# Patient Record
Sex: MALE | Race: WHITE | Employment: FULL TIME | URBAN - METROPOLITAN AREA
[De-identification: names, ages, dates, MRNs, and addresses within clinical notes are randomized per-mention and may not be internally consistent; named-entity substitution may affect disease eponyms.]

---

## 2021-09-06 ENCOUNTER — APPOINTMENT (OUTPATIENT)
Dept: GENERAL RADIOLOGY | Facility: CLINIC | Age: 31
DRG: 872 | End: 2021-09-06
Attending: EMERGENCY MEDICINE
Payer: COMMERCIAL

## 2021-09-06 ENCOUNTER — APPOINTMENT (OUTPATIENT)
Dept: ULTRASOUND IMAGING | Facility: CLINIC | Age: 31
DRG: 872 | End: 2021-09-06
Attending: PHYSICIAN ASSISTANT
Payer: COMMERCIAL

## 2021-09-06 ENCOUNTER — HOSPITAL ENCOUNTER (INPATIENT)
Facility: CLINIC | Age: 31
LOS: 6 days | Discharge: HOME | DRG: 872 | End: 2021-09-12
Attending: EMERGENCY MEDICINE | Admitting: INTERNAL MEDICINE
Payer: COMMERCIAL

## 2021-09-06 DIAGNOSIS — L03.116 CELLULITIS OF LEFT LOWER EXTREMITY: ICD-10-CM

## 2021-09-06 LAB
ANION GAP SERPL CALCULATED.3IONS-SCNC: 4 MMOL/L (ref 3–14)
BASOPHILS # BLD AUTO: 0 10E3/UL (ref 0–0.2)
BASOPHILS NFR BLD AUTO: 0 %
BUN SERPL-MCNC: 14 MG/DL (ref 7–30)
CALCIUM SERPL-MCNC: 8.6 MG/DL (ref 8.5–10.1)
CHLORIDE BLD-SCNC: 101 MMOL/L (ref 94–109)
CO2 SERPL-SCNC: 29 MMOL/L (ref 20–32)
CREAT SERPL-MCNC: 0.96 MG/DL (ref 0.66–1.25)
EOSINOPHIL # BLD AUTO: 0 10E3/UL (ref 0–0.7)
EOSINOPHIL NFR BLD AUTO: 0 %
ERYTHROCYTE [DISTWIDTH] IN BLOOD BY AUTOMATED COUNT: 13.2 % (ref 10–15)
GFR SERPL CREATININE-BSD FRML MDRD: >90 ML/MIN/1.73M2
GLUCOSE BLD-MCNC: 110 MG/DL (ref 70–99)
HCT VFR BLD AUTO: 40.2 % (ref 40–53)
HGB BLD-MCNC: 13.2 G/DL (ref 13.3–17.7)
HOLD SPECIMEN: NORMAL
IMM GRANULOCYTES # BLD: 0.1 10E3/UL
IMM GRANULOCYTES NFR BLD: 1 %
LYMPHOCYTES # BLD AUTO: 1.1 10E3/UL (ref 0.8–5.3)
LYMPHOCYTES NFR BLD AUTO: 10 %
MCH RBC QN AUTO: 28.5 PG (ref 26.5–33)
MCHC RBC AUTO-ENTMCNC: 32.8 G/DL (ref 31.5–36.5)
MCV RBC AUTO: 87 FL (ref 78–100)
MONOCYTES # BLD AUTO: 0.7 10E3/UL (ref 0–1.3)
MONOCYTES NFR BLD AUTO: 7 %
NEUTROPHILS # BLD AUTO: 8.6 10E3/UL (ref 1.6–8.3)
NEUTROPHILS NFR BLD AUTO: 82 %
NRBC # BLD AUTO: 0 10E3/UL
NRBC BLD AUTO-RTO: 0 /100
PLATELET # BLD AUTO: 129 10E3/UL (ref 150–450)
POTASSIUM BLD-SCNC: 3 MMOL/L (ref 3.4–5.3)
RBC # BLD AUTO: 4.63 10E6/UL (ref 4.4–5.9)
SODIUM SERPL-SCNC: 134 MMOL/L (ref 133–144)
WBC # BLD AUTO: 10.5 10E3/UL (ref 4–11)

## 2021-09-06 PROCEDURE — 99285 EMERGENCY DEPT VISIT HI MDM: CPT | Mod: 25

## 2021-09-06 PROCEDURE — 36415 COLL VENOUS BLD VENIPUNCTURE: CPT | Performed by: EMERGENCY MEDICINE

## 2021-09-06 PROCEDURE — 96365 THER/PROPH/DIAG IV INF INIT: CPT

## 2021-09-06 PROCEDURE — 93971 EXTREMITY STUDY: CPT | Mod: LT

## 2021-09-06 PROCEDURE — 73590 X-RAY EXAM OF LOWER LEG: CPT | Mod: LT

## 2021-09-06 PROCEDURE — 210N000002 HC R&B HEART CARE

## 2021-09-06 PROCEDURE — 82374 ASSAY BLOOD CARBON DIOXIDE: CPT | Performed by: EMERGENCY MEDICINE

## 2021-09-06 PROCEDURE — 250N000013 HC RX MED GY IP 250 OP 250 PS 637: Performed by: EMERGENCY MEDICINE

## 2021-09-06 PROCEDURE — 250N000011 HC RX IP 250 OP 636: Performed by: EMERGENCY MEDICINE

## 2021-09-06 PROCEDURE — 85025 COMPLETE CBC W/AUTO DIFF WBC: CPT | Performed by: PHYSICIAN ASSISTANT

## 2021-09-06 RX ORDER — POTASSIUM CHLORIDE 1500 MG/1
20 TABLET, EXTENDED RELEASE ORAL 2 TIMES DAILY
Qty: 10 TABLET | Refills: 1 | Status: SHIPPED | OUTPATIENT
Start: 2021-09-06 | End: 2021-09-12

## 2021-09-06 RX ORDER — CEPHALEXIN 500 MG/1
500 CAPSULE ORAL 4 TIMES DAILY
Qty: 40 CAPSULE | Refills: 0 | Status: SHIPPED | OUTPATIENT
Start: 2021-09-06 | End: 2021-09-12

## 2021-09-06 RX ORDER — CEFAZOLIN SODIUM 2 G/100ML
2 INJECTION, SOLUTION INTRAVENOUS ONCE
Status: COMPLETED | OUTPATIENT
Start: 2021-09-06 | End: 2021-09-06

## 2021-09-06 RX ORDER — POTASSIUM CHLORIDE 1500 MG/1
40 TABLET, EXTENDED RELEASE ORAL ONCE
Status: COMPLETED | OUTPATIENT
Start: 2021-09-06 | End: 2021-09-06

## 2021-09-06 RX ADMIN — CEFAZOLIN SODIUM 2 G: 2 INJECTION, SOLUTION INTRAVENOUS at 22:50

## 2021-09-06 RX ADMIN — POTASSIUM CHLORIDE 40 MEQ: 1500 TABLET, EXTENDED RELEASE ORAL at 23:39

## 2021-09-06 ASSESSMENT — MIFFLIN-ST. JEOR: SCORE: 2494.87

## 2021-09-06 ASSESSMENT — ENCOUNTER SYMPTOMS
FEVER: 1
VOMITING: 1
CHILLS: 1

## 2021-09-07 LAB
HOLD SPECIMEN: NORMAL
POTASSIUM BLD-SCNC: 3.2 MMOL/L (ref 3.4–5.3)
POTASSIUM BLD-SCNC: 3.4 MMOL/L (ref 3.4–5.3)
POTASSIUM BLD-SCNC: 3.4 MMOL/L (ref 3.4–5.3)
POTASSIUM BLD-SCNC: 3.7 MMOL/L (ref 3.4–5.3)
PROCALCITONIN SERPL-MCNC: 3.12 NG/ML
SARS-COV-2 RNA RESP QL NAA+PROBE: NEGATIVE

## 2021-09-07 PROCEDURE — 250N000013 HC RX MED GY IP 250 OP 250 PS 637: Performed by: INTERNAL MEDICINE

## 2021-09-07 PROCEDURE — 36415 COLL VENOUS BLD VENIPUNCTURE: CPT | Performed by: INTERNAL MEDICINE

## 2021-09-07 PROCEDURE — 210N000002 HC R&B HEART CARE

## 2021-09-07 PROCEDURE — 96372 THER/PROPH/DIAG INJ SC/IM: CPT | Performed by: INTERNAL MEDICINE

## 2021-09-07 PROCEDURE — 250N000011 HC RX IP 250 OP 636: Performed by: INTERNAL MEDICINE

## 2021-09-07 PROCEDURE — 250N000013 HC RX MED GY IP 250 OP 250 PS 637: Performed by: EMERGENCY MEDICINE

## 2021-09-07 PROCEDURE — 87635 SARS-COV-2 COVID-19 AMP PRB: CPT | Performed by: EMERGENCY MEDICINE

## 2021-09-07 PROCEDURE — 99222 1ST HOSP IP/OBS MODERATE 55: CPT | Mod: AI | Performed by: INTERNAL MEDICINE

## 2021-09-07 PROCEDURE — C9803 HOPD COVID-19 SPEC COLLECT: HCPCS

## 2021-09-07 PROCEDURE — 84132 ASSAY OF SERUM POTASSIUM: CPT | Performed by: INTERNAL MEDICINE

## 2021-09-07 PROCEDURE — 84145 PROCALCITONIN (PCT): CPT | Performed by: INTERNAL MEDICINE

## 2021-09-07 PROCEDURE — G0378 HOSPITAL OBSERVATION PER HR: HCPCS

## 2021-09-07 RX ORDER — POTASSIUM CHLORIDE 1500 MG/1
40 TABLET, EXTENDED RELEASE ORAL ONCE
Status: COMPLETED | OUTPATIENT
Start: 2021-09-07 | End: 2021-09-07

## 2021-09-07 RX ORDER — NALOXONE HYDROCHLORIDE 0.4 MG/ML
0.4 INJECTION, SOLUTION INTRAMUSCULAR; INTRAVENOUS; SUBCUTANEOUS
Status: DISCONTINUED | OUTPATIENT
Start: 2021-09-07 | End: 2021-09-12 | Stop reason: HOSPADM

## 2021-09-07 RX ORDER — OXYCODONE AND ACETAMINOPHEN 5; 325 MG/1; MG/1
1 TABLET ORAL EVERY 4 HOURS PRN
Status: DISCONTINUED | OUTPATIENT
Start: 2021-09-07 | End: 2021-09-12 | Stop reason: HOSPADM

## 2021-09-07 RX ORDER — FEXOFENADINE HCL 180 MG/1
180 TABLET ORAL DAILY PRN
COMMUNITY

## 2021-09-07 RX ORDER — ACETAMINOPHEN 650 MG/1
650 SUPPOSITORY RECTAL EVERY 6 HOURS PRN
Status: DISCONTINUED | OUTPATIENT
Start: 2021-09-07 | End: 2021-09-12 | Stop reason: HOSPADM

## 2021-09-07 RX ORDER — LIDOCAINE 40 MG/G
CREAM TOPICAL
Status: DISCONTINUED | OUTPATIENT
Start: 2021-09-07 | End: 2021-09-12 | Stop reason: HOSPADM

## 2021-09-07 RX ORDER — ACETAMINOPHEN 500 MG
1000 TABLET ORAL ONCE
Status: COMPLETED | OUTPATIENT
Start: 2021-09-07 | End: 2021-09-07

## 2021-09-07 RX ORDER — AMOXICILLIN 250 MG
2 CAPSULE ORAL 2 TIMES DAILY PRN
Status: DISCONTINUED | OUTPATIENT
Start: 2021-09-07 | End: 2021-09-12 | Stop reason: HOSPADM

## 2021-09-07 RX ORDER — ONDANSETRON 2 MG/ML
4 INJECTION INTRAMUSCULAR; INTRAVENOUS EVERY 6 HOURS PRN
Status: DISCONTINUED | OUTPATIENT
Start: 2021-09-07 | End: 2021-09-12 | Stop reason: HOSPADM

## 2021-09-07 RX ORDER — CEFAZOLIN SODIUM 2 G/100ML
2 INJECTION, SOLUTION INTRAVENOUS EVERY 8 HOURS
Status: DISCONTINUED | OUTPATIENT
Start: 2021-09-07 | End: 2021-09-09

## 2021-09-07 RX ORDER — NALOXONE HYDROCHLORIDE 0.4 MG/ML
0.2 INJECTION, SOLUTION INTRAMUSCULAR; INTRAVENOUS; SUBCUTANEOUS
Status: DISCONTINUED | OUTPATIENT
Start: 2021-09-07 | End: 2021-09-12 | Stop reason: HOSPADM

## 2021-09-07 RX ORDER — AMOXICILLIN 250 MG
1 CAPSULE ORAL 2 TIMES DAILY PRN
Status: DISCONTINUED | OUTPATIENT
Start: 2021-09-07 | End: 2021-09-12 | Stop reason: HOSPADM

## 2021-09-07 RX ORDER — FLUTICASONE PROPIONATE 50 MCG
1 SPRAY, SUSPENSION (ML) NASAL DAILY PRN
COMMUNITY

## 2021-09-07 RX ORDER — ONDANSETRON 4 MG/1
4 TABLET, ORALLY DISINTEGRATING ORAL EVERY 6 HOURS PRN
Status: DISCONTINUED | OUTPATIENT
Start: 2021-09-07 | End: 2021-09-12 | Stop reason: HOSPADM

## 2021-09-07 RX ORDER — ACETAMINOPHEN 325 MG/1
650 TABLET ORAL EVERY 6 HOURS PRN
Status: DISCONTINUED | OUTPATIENT
Start: 2021-09-07 | End: 2021-09-12 | Stop reason: HOSPADM

## 2021-09-07 RX ADMIN — POTASSIUM CHLORIDE 40 MEQ: 1500 TABLET, EXTENDED RELEASE ORAL at 08:15

## 2021-09-07 RX ADMIN — POTASSIUM CHLORIDE 40 MEQ: 1500 TABLET, EXTENDED RELEASE ORAL at 18:33

## 2021-09-07 RX ADMIN — ENOXAPARIN SODIUM 40 MG: 40 INJECTION SUBCUTANEOUS at 15:53

## 2021-09-07 RX ADMIN — ACETAMINOPHEN 650 MG: 325 TABLET, FILM COATED ORAL at 16:08

## 2021-09-07 RX ADMIN — ACETAMINOPHEN 1000 MG: 500 TABLET, FILM COATED ORAL at 00:15

## 2021-09-07 RX ADMIN — POTASSIUM CHLORIDE 40 MEQ: 1500 TABLET, EXTENDED RELEASE ORAL at 13:26

## 2021-09-07 RX ADMIN — CEFAZOLIN SODIUM 2 G: 2 INJECTION, SOLUTION INTRAVENOUS at 15:52

## 2021-09-07 RX ADMIN — CEFAZOLIN SODIUM 2 G: 2 INJECTION, SOLUTION INTRAVENOUS at 08:15

## 2021-09-07 ASSESSMENT — ACTIVITIES OF DAILY LIVING (ADL)
ADLS_ACUITY_SCORE: 17
TOILETING_ISSUES: NO
DOING_ERRANDS_INDEPENDENTLY_DIFFICULTY: NO
ADLS_ACUITY_SCORE: 15
WALKING_OR_CLIMBING_STAIRS_DIFFICULTY: NO
FALL_HISTORY_WITHIN_LAST_SIX_MONTHS: NO
WEAR_GLASSES_OR_BLIND: YES
DIFFICULTY_EATING/SWALLOWING: NO
DIFFICULTY_COMMUNICATING: NO
DRESSING/BATHING_DIFFICULTY: NO
CONCENTRATING,_REMEMBERING_OR_MAKING_DECISIONS_DIFFICULTY: NO

## 2021-09-07 ASSESSMENT — MIFFLIN-ST. JEOR: SCORE: 2680.84

## 2021-09-07 NOTE — ED PROVIDER NOTES
Rapid Assessment Note    History:   Emanuel Wilkinson is a 31 year old male with history of lymphedema who presents with left leg swelling. 4 days ago on Thursday the patient got what he believes to be food poisoning, and was vomiting for the following 2-3 days. Over the course of those days the patient felt feverish and had chills, although he never took his temperature. Yesterday the patient noticed increased swelling, soreness, and erythema of his left leg from baseline. These symptoms are exacerbated with ambulation and progressively worsened over the last 24 hours, prompting him to present today. Here in the ED his left lower leg is painful, and he denies any open wounds or known history of blood clots. Compared to his chronic history of leg edema, he states today's symptoms are more severe in nature and have not been alleviated with compression socks, ice, or elevation.     Exam:   Constitutional: alert, cooperative   CV: 2+ DP pulses, brisk distal cap refill  Pulm: No respiratory distress  MSK: left lower leg diffusely erythematous and swollen compared to right.   Neurological: Alert, attentive.  Strength and sensation intact to bilateral lower legs.  Skin: Skin is warm and dry.   Psych: Normal mood and affect     Plan of Care:   I evaluated the patient and developed an initial plan of care. I discussed this plan and explained that I, or one of my partners, would be returning to complete the evaluation.     I, Cristina Martinez, am serving as a scribe to document services personally performed by Tricia Rae PA-C, based on my observations and the provider's statements to me.    09/06/2021  [facility]    Portions of this medical record were completed by a scribe. UPON MY REVIEW AND AUTHENTICATION BY ELECTRONIC SIGNATURE, this confirms (a) I performed the applicable clinical services, and (b) the record is accurate.      Tricia Rae PA-C  09/06/21 [id number]

## 2021-09-07 NOTE — UTILIZATION REVIEW
"  Admission Status; Secondary Review Determination         Under the authority of the Utilization Management Committee, the utilization review process indicated a secondary review on the above patient.  The review outcome is based on review of the medical records, discussions with staff, and applying clinical experience noted on the date of the review.          (x) Observation Status Appropriate - This patient does not meet hospital inpatient criteria and is placed in observation status. If this patient's primary payer is Medicare and was admitted as an inpatient, Condition Code 44 should be used and patient status changed to \"observation\".     RATIONALE FOR DETERMINATION   31-year-old admitted with lymphedema and left lower extremity cellulitis, no leukocytosis or evidence of severe sepsis fever 100.4 in the emergency room he was hypokalemic receiving replacement.  No immunosuppression no infection of the prosthetic  The severity of illness, intensity of service provided, expected LOS and risk for adverse outcome make the care appropriate for further observation; however, doesn't meet criteria for hospital inpatient admission. Dr Kruger notified of this determination.    This document was produced using voice recognition software.      The information on this document is developed by the utilization review team in order for the business office to ensure compliance.  This only denotes the appropriateness of proper admission status and does not reflect the quality of care rendered.         The definitions of Inpatient Status and Observation Status used in making the determination above are those provided in the CMS Coverage Manual, Chapter 1 and Chapter 6, section 70.4.      Sincerely,     BARRETT RICKETTS MD    System Medical Director  Utilization Management  [facility].      "

## 2021-09-07 NOTE — ED TRIAGE NOTES
Pt presents with L leg swelling- pt states this has been a chronic problem and has been diagnosed with lymphedema. He had a US performed a while ago but nothing in recent years. Pt states over the weekend he was ill with vomiting (thinks it may be food poisoning) and then noticed today his leg was drastically worse. The leg is very swollen, has eccymosis, warm and redness. Pt reports pain with walking as well

## 2021-09-07 NOTE — ED PROVIDER NOTES
History   Chief Complaint:  Leg Swelling    The history is provided by the patient.      Emanuel Wilkinson is a 31 year old male with history of obesity and lymphedema who presents with pain, redness, and swelling to the distal aspect of his left lower extremity beginning yesterday and worsening since. The patient reports recent illness of emesis beginning 4 days ago and lasting for a period of 3 days, since resolving. At that time he experienced a subjective fever along with chills. He first noticed the swelling and tenderness to his leg last night, waking up this morning with significantly worsened symptoms, prompting his arrival to the ED today. Of note, the patient wears compression socks for his lymphedema, noting that the redness to his leg cuts off just where the JONNA stocking ends on his calf. He denies any recent surgeries or history of blood clots. The patient is fully COVID-19 vaccinated.     Review of Systems   Constitutional: Positive for chills (resolved) and fever (resolved).   Cardiovascular: Positive for leg swelling (left, lower).   Gastrointestinal: Positive for vomiting (resolved).   Skin: Positive for rash (with pain).   All other systems reviewed and are negative.        Allergies:  No known drug allergies    Medications:  Fexofenadine  Fluticasone  Norco    Past Medical History:    Lymphedema  Obesity    Social History:  The patient presented to the ED with his mother today.  The patient arrived in a private vehicle today.    Physical Exam     Patient Vitals for the past 24 hrs:   BP Temp Temp src Pulse Resp SpO2 Height Weight   09/06/21 2100 132/78 -- -- 92 -- 98 % -- --   09/06/21 1949 (!) 154/88 98.7  F (37.1  C) Oral 90 20 97 % 1.829 m (6') 149.7 kg (330 lb)     Physical Exam  Vitals reviewed.   Constitutional:       Appearance: He is obese.   HENT:      Head: Normocephalic.      Nose: Nose normal.   Eyes:      Conjunctiva/sclera: Conjunctivae normal.      Pupils: Pupils are equal, round,  and reactive to light.   Cardiovascular:      Rate and Rhythm: Normal rate.   Pulmonary:      Effort: Pulmonary effort is normal.   Abdominal:      Palpations: Abdomen is soft.   Skin:     Capillary Refill: Capillary refill takes less than 2 seconds.      Comments: There are purpura and erythema and warmth and tenderness and swelling of the left leg that extend from the dorsum of the foot up to the proximal left calf.  There is no clear open wound.  There is no fluctuance or crepitus.   Neurological:      General: No focal deficit present.      Mental Status: He is alert.   Psychiatric:         Mood and Affect: Mood normal.               Emergency Department Course     Imaging:    US Lower Extremity Venous Duplex Left  1.  No deep venous thrombosis in the left lower extremity although exam is compromised from patient's body habitus. Subcutaneous edema distally.  Results Per Radiology    XR Tibia & Fibula Left 2 Views  Marked soft tissue swelling. No evidence for soft tissue. Tibia and fibula appear normal.  Results Per Radiology    Laboratory:    CBC: WBC 10.5, HGB 13.2 (L),  (L)     BMP: Potassium 3.0 (L) Glucose 110 (H) o/w WNL (Creatinine 0.96)     Asymptomatic COVID-19 Virus (Coronavirus) by PCR Nasopharyngeal: Pending    Emergency Department Course:    Reviewed:  I reviewed nursing notes, vitals, past medical history and care everywhere.    Assessments:  2207 I obtained history and examined the patient as noted above.   2249 I rechecked the patient and explained findings. I took images of his leg at this time for documentation.  2340 I rechecked the patient. I explained plan for admission due to his febrile state.    Consults:  2351 I consulted with Dr. Arroyo, hospitalist, regarding the patient's history and presentation here in the emergency department who accepted the patient for admission.    Interventions:  2250 Ancef 2 g IV  2339 Potassium Chloride ER 40 mEq PO    Disposition:  The patient was  admitted to the hospital under the care of Dr. Arroyo.    Impression & Plan     Medical Decision Making:  Patient presents with left leg swelling and pain.  Examination is highly suspicious for cellulitis patient's ultrasound was performed and negative for DVT lab work shows normal white count x-ray shows no no subcutaneous air IV Ancef was initiated for cellulitis initially considered sending the patient home but while in the emergency room spiked a temperature that went from 98 4-100.4.  Based on low-grade fever in the setting of cellulitis recommend admission for IV antibiotics care was discussed with Dr. Arroyo admitted as an inpatient.    Covid-19  Emanuel Wilkinson was evaluated during a global COVID-19 pandemic, which necessitated consideration that the patient might be at risk for infection with the SARS-CoV-2 virus that causes COVID-19.   Applicable protocols for evaluation were followed during the patient's care.   COVID-19 was considered as part of the patient's evaluation. The plan for testing is:  a test was obtained during this visit.    Diagnosis:    ICD-10-CM    1. Cellulitis of left lower extremity  L03.116      Discharge Medications:  New Prescriptions    CEPHALEXIN (KEFLEX) 500 MG CAPSULE    Take 1 capsule (500 mg) by mouth 4 times daily for 10 days    POTASSIUM CHLORIDE ER (KLOR-CON M) 20 MEQ CR TABLET    Take 1 tablet (20 mEq) by mouth 2 times daily     Scribe Disclosure:  I, Tonie Maers, am serving as a scribe at 9:45 PM on 9/6/2021 to document services personally performed by Thad Cavanaugh MD based on my observations and the provider's statements to me.       Thad Cavanaugh MD  09/07/21 0058

## 2021-09-07 NOTE — DISCHARGE INSTRUCTIONS
We have performed lab work and x-rays and ultrasounds of your left leg that are all normal.  Clinical suspicions are for cellulitis.  Please continue antibiotic for 10-day course.  Return with rapid increase in redness up the leg or fever greater than 100.4.  Complete 10-day course of antibiotics.  Your potassium level was 3.0 please have this rechecked through your regular doctor consider potassium supplementation and diet or pills.

## 2021-09-07 NOTE — PHARMACY-ADMISSION MEDICATION HISTORY
Pharmacy Medication History  Admission medication history interview status for the 9/6/2021  admission is complete. See EPIC admission navigator for prior to admission medications     Location of Interview: Phone  Medication history sources: Patient, Surescripts and Care Everywhere    Significant changes made to the medication list:  Added both medications to list.     In the past week, patient estimated taking medication this percent of the time: 50-90% due to PRN meds    Additional medication history information:   None    Medication reconciliation completed by provider prior to medication history? No    Time spent in this activity: 5 minutes     Prior to Admission medications    Medication Sig Last Dose Taking? Auth Provider   fexofenadine (ALLEGRA) 180 MG tablet Take 180 mg by mouth daily as needed for allergies  at prn Yes Unknown, Entered By History   fluticasone (FLONASE) 50 MCG/ACT nasal spray Spray 1 spray into both nostrils daily as needed for rhinitis or allergies  at prn Yes Unknown, Entered By History       The information provided in this note is only as accurate as the sources available at the time of update(s)

## 2021-09-07 NOTE — PLAN OF CARE
Pt A&Ox4, VSS on RA ex fever- PRN tylenol and ice packs given. Indep, regular diet. PIV SL; intermit IV abx. LLE 4+ edema; swelling, warm to the touch. Denies pain at rest.  RLE 2+ edema- per pt this is baseline. K+ 3.4 replaced PO recheck @2300.

## 2021-09-07 NOTE — ED NOTES
Bed: ED20  Expected date: 9/6/21  Expected time:   Means of arrival:   Comments:  Triage, FT4, leg swelling , please get pt from FT

## 2021-09-07 NOTE — PROGRESS NOTES
RECEIVING UNIT ED HANDOFF REVIEW    ED Nurse Handoff Report was reviewed by: Ana Paula Bernardo RN on September 7, 2021 at 1:57 AM

## 2021-09-07 NOTE — PLAN OF CARE
VSS. Pt. Denies pain. Left lower extremity is red, shiny, warm and edematous. Continue IV antibiotics per order.

## 2021-09-07 NOTE — ED NOTES
[facility]  ED Nurse Handoff Report    ED Chief complaint: Leg Swelling      ED Diagnosis:   Final diagnoses:   Cellulitis of left lower extremity       Code Status: Full Code    Allergies: No Known Allergies    Patient Story: Presents with left lower leg swelling, redness and pain since last night.       Focused Assessment:  A&Ox4. Left lower extremity below knee erythema, edema, tenderness to touch. +bullae no drainage. Febrile at 2300 with tachycardia. Vital signs normalizing.     Treatments and/or interventions provided: LLE ultrasound, xray, labs, IV abx, tylenol, leg elevation  Patient's response to treatments and/or interventions: stable    To be done/followed up on inpatient unit:  stable    Does this patient have any cognitive concerns?: none    Activity level - Baseline/Home:  Independent  Activity Level - Current:   Independent    Patient's Preferred language: English   Needed?: No    Isolation: None  Infection: Not Applicable  Patient tested for COVID 19 prior to admission: YES  Bariatric?: No    Vital Signs:   Vitals:    09/06/21 1949 09/06/21 2100 09/06/21 2338   BP: (!) 154/88 132/78 137/84   Pulse: 90 92 97   Resp: 20     Temp: 98.7  F (37.1  C)  100.4  F (38  C)   TempSrc: Oral  Temporal   SpO2: 97% 98% 99%   Weight: 149.7 kg (330 lb)     Height: 1.829 m (6')         [facility]  ED Nurse Handoff Report    ED Chief complaint: Leg Swelling      ED Diagnosis:   Final diagnoses:   Cellulitis of left lower extremity       Code Status: Full Code    Allergies: No Known Allergies    Patient Story:   Focused Assessment:      Treatments and/or interventions provided:   Patient's response to treatments and/or interventions:     To be done/followed up on inpatient unit:      Does this patient have any cognitive concerns?: none    Activity level - Baseline/Home:  Independent  Activity Level - Current:   Independent    Patient's Preferred language: English    Needed?: No    Isolation: None  Infection: Not Applicable  Patient tested for COVID 19 prior to admission: YES  Bariatric?: No    Vital Signs:   Vitals:    09/06/21 1949 09/06/21 2100 09/06/21 2338   BP: (!) 154/88 132/78 137/84   Pulse: 90 92 97   Resp: 20     Temp: 98.7  F (37.1  C)  100.4  F (38  C)   TempSrc: Oral  Temporal   SpO2: 97% 98% 99%   Weight: 149.7 kg (330 lb)     Height: 1.829 m (6')         Cardiac Rhythm:     Was the PSS-3 completed:   Yes  What interventions are required if any?               Family Comments:   OBS brochure/video discussed/provided to patient/family: Yes              Name of person given brochure if not patient: n/a              Relationship to patient: n/a    For the majority of the shift this patient's behavior was Green.   Behavioral interventions performed were none.    ED NURSE PHONE NUMBER: *[phone]       Cardiac Rhythm:     Was the PSS-3 completed:   Yes  What interventions are required if any?               Family Comments: mother at bedside   OBS brochure/video discussed/provided to patient/family: Yes              Name of person given brochure if not patient: n/a              Relationship to patient: n/a    For the majority of the shift this patient's behavior was Green.   Behavioral interventions performed were none.    ED NURSE PHONE NUMBER: *[phone]

## 2021-09-07 NOTE — PROGRESS NOTES
[facility]    Medicine Progress Note - Hospitalist Service       Date of Admission:  9/6/2021    Assessment & Plan           Emanuel Wilkinson is a 31 year old male who presents with leg swelling     LLE cellulitis  Lymphedema  Typically wears compression socks. With LLE pain and erythema in last day, also has had subjective fevers and chills. Fever to 100.4 in ED. Tachycardic, BP stable. WBC normal. US without DVT. Subcutaneous edema noted. Procal 3.2  - nursing demarcate area of infection  - continue ancef 2g IV q8hrs  - elevate leg  - prn analgesics     Hypokalemia  K on presentation at 3.0  - replace per protocol      Morbid obesity  Encourage weight loss and healthy lifestyle.      Asymptomatic COVID-19 screening-negative       Diet: Combination Diet Regular Diet Adult    DVT Prophylaxis: Enoxaparin (Lovenox) SQ  Perdomo Catheter: Not present  Central Lines: None  Code Status: Full Code      Disposition Plan   Expected discharge: 09/09/2021   recommended to prior living arrangement once improving cellulitis and transition to PO antibiotics.     The patient's care was discussed with the Bedside Nurse, Patient and Patient's Family.    Lico Kruger MD  Hospitalist Service  [facility]  Securely message with the Vocera Web Console (learn more here)  Text page via C[employer] Paging/Directory      Clinically Significant Risk Factors Present on Admission              # Thrombocytopenia: Plts = 129 10e3/uL (Ref range: 150 - 450 10e3/uL) on admission, will monitor for bleeding      ______________________________________________________________________    Interval History   Afebrile since admission. Continued left leg swelling and erythema. More painful when he attempts to walk. Denies n/v or abdominal pain.     Data reviewed today: I reviewed all medications, new labs and imaging results over the last 24 hours. I personally reviewed no images or EKG's today.    Physical  Exam   Vital Signs: Temp: 98.6  F (37  C) Temp src: Oral BP: (!) 145/81 Pulse: 103   Resp: 18 SpO2: 99 % O2 Device: None (Room air)    Weight: 371 lbs 0 oz  General Appearance: Alert, awake and no apparent distress  Respiratory: clear to auscultation bilaterally, no wheezing  Cardiovascular: regular rate and rhythm  GI: soft and non-tender  Skin: LLE, + edema, warmth and erythema shin down to ankle, also dorsum of foot.      Data   Recent Labs   Lab 09/07/21  1222 09/07/21  0238 09/06/21  2220   WBC  --   --  10.5   HGB  --   --  13.2*   MCV  --   --  87   PLT  --   --  129*   NA  --   --  134   POTASSIUM 3.4 3.2* 3.0*   CHLORIDE  --   --  101   CO2  --   --  29   BUN  --   --  14   CR  --   --  0.96   ANIONGAP  --   --  4   JANINE  --   --  8.6   GLC  --   --  110*     Recent Results (from the past 24 hour(s))   US Lower Extremity Venous Duplex Left    Narrative    EXAM: US LOWER EXTREMITY VENOUS DUPLEX LEFT  LOCATION: [facility]  DATE/TIME: 9/6/2021 9:42 PM    INDICATION: Leg pain swelling and erythema  COMPARISON: None.  TECHNIQUE: Venous Duplex ultrasound of the left lower extremity with and without compression, augmentation and duplex. Color flow and spectral Doppler with waveform analysis performed.    FINDINGS: Exam includes the common femoral, femoral, popliteal, and contralateral common femoral veins as well as segmentally visualized deep calf veins and greater saphenous vein.     LEFT: No deep vein thrombosis. No superficial thrombophlebitis. No popliteal cyst. Nonspecific subcutaneous edema.      Impression    IMPRESSION:  1.  No deep venous thrombosis in the left lower extremity although exam is compromised from patient's body habitus. Subcutaneous edema distally.   XR Tibia & Fibula Left 2 Views    Narrative    EXAM: XR TIBIA and FIBULA LT 2 VW  LOCATION: [facility]  DATE/TIME: 9/6/2021 11:01 PM    INDICATION: eval for gas, swelling redness  COMPARISON:  None.      Impression    IMPRESSION: Marked soft tissue swelling. No evidence for soft tissue. Tibia and fibula appear normal.     Medications       ceFAZolin  2 g Intravenous Q8H     sodium chloride (PF)  3 mL Intracatheter Q8H

## 2021-09-07 NOTE — PROGRESS NOTES
Spoke with patient via phone regarding health insurance and patient states that he does have insurance and will have someone bring card in this afternoon.    9:57 AM  Per patient, he was seeing Dr. Emanuel Wong MD thru [facility] system for PCP but is thinking of changing his PCP.  He is undecided at this time.      2:21 PM  Rec'd insurance card from patient, copy made and sent to registration.    Pauline Pollard RN  Care Coordinator  [facility]  [phone] (text or call)

## 2021-09-07 NOTE — H&P
[facility]    History and Physical  Hospitalist       Date of Admission:  9/6/2021  Date of Service (when I saw the patient): 09/07/21    Assessment & Plan   Emanuel Wilkinson is a 31 year old male who presents with leg swelling    LLE cellulitis  Lymphedema  Typically wears compression socks. With LLE pain and erythema in last day, also has had subjective fevers and chills. Fever to 100.4 in ED. Tachycardic, BP stable. WBC normal. US without DVT. Subcutaneous edema noted.   - nursing demarcate area of infection  - continue ancef 2g IV   - check procal  - prn analgesics    Hypokalemia  K on presentation at 3.0  - replace per protocol     Morbid obesity  Encourage weight loss and healthy lifestyle.     COVID-19 pending    DVT Prophylaxis: Ambulate every shift  Code Status: Full Code    Disposition: Expected discharge in 2-3 days     Rj Arroyo MD  [phone] (P)  Text Page     Primary Care Physician   Physician No Ref-Primary    Chief Complaint   LLE pain, erythema and swelling    History is obtained from the patient and medical records    History of Present Illness   Emanuel Wilkinson is a 31 year old male who presents with left lower extremity swelling, redness and pain.  He recently had an upper GI illness with nausea and vomiting but this appeared to resolve.  Last day or day and a half he started to notice redness and pain in his left lower extremity.  On the day of presentation the symptoms worsened and become more painful.  Walking hurts.  He denied fevers or chills.  He denied any history of infection in his lower extremity.  Denies any chest pain or shortness of breath.  He typically does have some minimal redness on the anterior aspect of his shin on the left side.    Past Medical History    I have reviewed this patient's medical history and updated it with pertinent information if needed.   Past Medical History:   Diagnosis Date     Seasonal allergies        Past Surgical  History   I have reviewed this patient's surgical history and updated it with pertinent information if needed.  No past surgical history on file.    Prior to Admission Medications   Prior to Admission Medications   Prescriptions Last Dose Informant Patient Reported? Taking?   fexofenadine (ALLEGRA) 180 MG tablet  at prn  Yes Yes   Sig: Take 180 mg by mouth daily as needed for allergies   fluticasone (FLONASE) 50 MCG/ACT nasal spray  at prn  Yes Yes   Sig: Spray 1 spray into both nostrils daily as needed for rhinitis or allergies      Facility-Administered Medications: None     Allergies   No Known Allergies    Social History   I have reviewed this patient's social history and updated it with pertinent information if needed. Emanuel Wilkinson  reports that he has never smoked. He does not have any smokeless tobacco history on file. He reports current alcohol use of about 3.3 standard drinks of alcohol per week. He reports that he does not use drugs.    Family History   I have reviewed this patient's family history and updated it with pertinent information if needed.   Family history reviewed, pertinent family history as above    Review of Systems   The 10 point Review of Systems is negative other than noted in the HPI or here.     Physical Exam   Temp: 100.4  F (38  C) Temp src: Temporal BP: 137/84 Pulse: 97   Resp: 20 SpO2: 99 % O2 Device: None (Room air)    Vital Signs with Ranges  330 lbs 0 oz    Constitutional: alert, oriented and in no acute distress  Eyes: EOMI, PERRL  HEENT: OP clear  Respiratory: CTA B without w/c  Cardiovascular: RRR no murmur. LLE edema  GI: soft, obese, nontender  Lymph/Hematologic: no cervical LAD  Genitourinary: deferred  Skin: erythema of L leg approx 2/3 way up lower leg. Circumferential. ? Bullae on anterior shin. Erythema to level of ankle, some erythema on top of foot  Musculoskeletal: no deformities or arthritis  Neurologic: CN II-XII, MENON  Psychiatric: mood and affect wnl    Data    Data reviewed today:  I personally reviewed the US image(s) showing no DVT. XR without gas.  Recent Labs   Lab 09/06/21  2220   WBC 10.5   HGB 13.2*   MCV 87   *      POTASSIUM 3.0*   CHLORIDE 101   CO2 29   BUN 14   CR 0.96   ANIONGAP 4   JANINE 8.6   *       Recent Results (from the past 24 hour(s))   US Lower Extremity Venous Duplex Left    Narrative    EXAM: US LOWER EXTREMITY VENOUS DUPLEX LEFT  LOCATION: [facility]  DATE/TIME: 9/6/2021 9:42 PM    INDICATION: Leg pain swelling and erythema  COMPARISON: None.  TECHNIQUE: Venous Duplex ultrasound of the left lower extremity with and without compression, augmentation and duplex. Color flow and spectral Doppler with waveform analysis performed.    FINDINGS: Exam includes the common femoral, femoral, popliteal, and contralateral common femoral veins as well as segmentally visualized deep calf veins and greater saphenous vein.     LEFT: No deep vein thrombosis. No superficial thrombophlebitis. No popliteal cyst. Nonspecific subcutaneous edema.      Impression    IMPRESSION:  1.  No deep venous thrombosis in the left lower extremity although exam is compromised from patient's body habitus. Subcutaneous edema distally.   XR Tibia & Fibula Left 2 Views    Narrative    EXAM: XR TIBIA and FIBULA LT 2 VW  LOCATION: [facility]  DATE/TIME: 9/6/2021 11:01 PM    INDICATION: eval for gas, swelling redness  COMPARISON: None.      Impression    IMPRESSION: Marked soft tissue swelling. No evidence for soft tissue. Tibia and fibula appear normal.

## 2021-09-08 LAB
ANION GAP SERPL CALCULATED.3IONS-SCNC: 6 MMOL/L (ref 3–14)
BUN SERPL-MCNC: 13 MG/DL (ref 7–30)
CALCIUM SERPL-MCNC: 8.6 MG/DL (ref 8.5–10.1)
CHLORIDE BLD-SCNC: 103 MMOL/L (ref 94–109)
CO2 SERPL-SCNC: 26 MMOL/L (ref 20–32)
CREAT SERPL-MCNC: 0.84 MG/DL (ref 0.66–1.25)
CRP SERPL-MCNC: 306 MG/L (ref 0–8)
ERYTHROCYTE [DISTWIDTH] IN BLOOD BY AUTOMATED COUNT: 13.2 % (ref 10–15)
GFR SERPL CREATININE-BSD FRML MDRD: >90 ML/MIN/1.73M2
GLUCOSE BLD-MCNC: 100 MG/DL (ref 70–99)
HCT VFR BLD AUTO: 35.3 % (ref 40–53)
HGB BLD-MCNC: 11.8 G/DL (ref 13.3–17.7)
MCH RBC QN AUTO: 28.9 PG (ref 26.5–33)
MCHC RBC AUTO-ENTMCNC: 33.4 G/DL (ref 31.5–36.5)
MCV RBC AUTO: 86 FL (ref 78–100)
PLATELET # BLD AUTO: 126 10E3/UL (ref 150–450)
POTASSIUM BLD-SCNC: 3.7 MMOL/L (ref 3.4–5.3)
RBC # BLD AUTO: 4.09 10E6/UL (ref 4.4–5.9)
SODIUM SERPL-SCNC: 135 MMOL/L (ref 133–144)
WBC # BLD AUTO: 11.3 10E3/UL (ref 4–11)

## 2021-09-08 PROCEDURE — 36415 COLL VENOUS BLD VENIPUNCTURE: CPT | Performed by: INTERNAL MEDICINE

## 2021-09-08 PROCEDURE — 86140 C-REACTIVE PROTEIN: CPT | Performed by: HOSPITALIST

## 2021-09-08 PROCEDURE — 85027 COMPLETE CBC AUTOMATED: CPT | Performed by: INTERNAL MEDICINE

## 2021-09-08 PROCEDURE — 84295 ASSAY OF SERUM SODIUM: CPT | Performed by: INTERNAL MEDICINE

## 2021-09-08 PROCEDURE — 250N000011 HC RX IP 250 OP 636: Performed by: INTERNAL MEDICINE

## 2021-09-08 PROCEDURE — 250N000013 HC RX MED GY IP 250 OP 250 PS 637: Performed by: INTERNAL MEDICINE

## 2021-09-08 PROCEDURE — 99233 SBSQ HOSP IP/OBS HIGH 50: CPT | Performed by: HOSPITALIST

## 2021-09-08 PROCEDURE — 210N000002 HC R&B HEART CARE

## 2021-09-08 RX ADMIN — CEFAZOLIN SODIUM 2 G: 2 INJECTION, SOLUTION INTRAVENOUS at 13:33

## 2021-09-08 RX ADMIN — CEFAZOLIN SODIUM 2 G: 2 INJECTION, SOLUTION INTRAVENOUS at 06:02

## 2021-09-08 RX ADMIN — ACETAMINOPHEN 650 MG: 325 TABLET, FILM COATED ORAL at 00:09

## 2021-09-08 RX ADMIN — CEFAZOLIN SODIUM 2 G: 2 INJECTION, SOLUTION INTRAVENOUS at 21:21

## 2021-09-08 RX ADMIN — CEFAZOLIN SODIUM 2 G: 2 INJECTION, SOLUTION INTRAVENOUS at 00:02

## 2021-09-08 RX ADMIN — ACETAMINOPHEN 650 MG: 325 TABLET, FILM COATED ORAL at 11:39

## 2021-09-08 RX ADMIN — ACETAMINOPHEN 650 MG: 325 TABLET, FILM COATED ORAL at 20:17

## 2021-09-08 RX ADMIN — ENOXAPARIN SODIUM 40 MG: 40 INJECTION SUBCUTANEOUS at 16:15

## 2021-09-08 ASSESSMENT — ACTIVITIES OF DAILY LIVING (ADL)
ADLS_ACUITY_SCORE: 17
ADLS_ACUITY_SCORE: 17

## 2021-09-08 ASSESSMENT — MIFFLIN-ST. JEOR: SCORE: 2668.14

## 2021-09-08 NOTE — PROGRESS NOTES
[facility]    Medicine Progress Note - Hospitalist Service       Date of Admission:  9/6/2021    Assessment & Plan         Emanuel Wilkinson is a 31 year old male admitted with left lower extremity cellulitis.  He has significant leg edema- ultrasound negative for deep venous thrombosis.  He has been on intravenous cefazolin.      Left lower extremity cellulitis   Chronic left lower extremity edema   The the leg looks a little better.  Afebrile so far this morning.    Continue intravenous cefazolin    Keep left lower extremity elevated     Monitor fever curve    Check C-reactive protein     Repeat WBC and pro-calcitonin tomorrow     Hypokalemia   Potassium   Date Value Ref Range Status   09/08/2021 3.7 3.4 - 5.3 mmol/L Final   Corrected     Thrombocytopenia   ? Acute or chronic     Monitor while on enoxaparin and follow up with primary care provider     Elevated blood pressure     Needs primary care provider follow up and home monitoring    Morbid obesity    He needs lifestyle modification and follow up with a primary care provider      Diet: Combination Diet Regular Diet Adult    DVT Prophylaxis: Enoxaparin (Lovenox) SQ  Perdomo Catheter: Not present  Central Lines: None  Code Status: Full Code      Disposition Plan   Expected discharge: 09/09/2021   recommended to prior living arrangement once antibiotic plan established.     The patient's care was discussed with the Patient and Patient's Family.    Tawanda Wilson MD  Hospitalist Service  [facility]  Securely message with the Vocera Web Console (learn more here)  Text page via [email or web address] Paging/Directory    Clinically Significant Risk Factors Present on Admission              # Thrombocytopenia: Plts = 126 10e3/uL (Ref range: 150 - 450 10e3/uL) on admission, will monitor for bleeding      ______________________________________________________________________    Interval History   Stable overnight. Leg looks a  little better according to the patient and his father.      Data reviewed today: I reviewed all medications, new labs and imaging results over the last 24 hours. I personally reviewed no images or EKG's today.    Physical Exam   Vital Signs: Temp: 99.6  F (37.6  C) Temp src: Oral BP: (!) 151/73 Pulse: 100   Resp: 16 SpO2: 96 % O2 Device: None (Room air)    Weight: 368 lbs 3.2 oz   Body mass index is 49.94 kg/m .    Constitutional: awake, alert, cooperative, no apparent distress, and appears stated age  Musculoskeletal: There is no redness, warmth, or swelling of the joints.  Full range of motion noted.  Motor strength is 5 out of 5 all extremities bilaterally.  Tone is normal.  Neuropsychiatric: General: normal, calm and normal eye contact    Data   Recent Labs   Lab 09/08/21  0533 09/07/21  2307 09/07/21  1751 09/06/21  2220   WBC 11.3*  --   --  10.5   HGB 11.8*  --   --  13.2*   MCV 86  --   --  87   *  --   --  129*     --   --  134   POTASSIUM 3.7 3.7 3.4 3.0*   CHLORIDE 103  --   --  101   CO2 26  --   --  29   BUN 13  --   --  14   CR 0.84  --   --  0.96   ANIONGAP 6  --   --  4   JANINE 8.6  --   --  8.6   *  --   --  110*     No results found for this or any previous visit (from the past 24 hour(s)).  Medications       ceFAZolin  2 g Intravenous Q8H     enoxaparin ANTICOAGULANT  40 mg Subcutaneous Q24H     sodium chloride (PF)  3 mL Intracatheter Q8H

## 2021-09-08 NOTE — PLAN OF CARE
A&OX4, temp 99.1  tylenol given, tachycardic, HR in the 100s, RA, denies pain. Tele- none, no orders, up with SBA. LLE swelling, warm. Left leg elevated. On ancef IV. Regular diet.

## 2021-09-08 NOTE — UTILIZATION REVIEW
"  Admission Status; Secondary Review Determination         Under the authority of the Utilization Management Committee, the utilization review process indicated a secondary review on the above patient.  The review outcome is based on review of the medical records, discussions with staff, and applying clinical experience noted on the date of the review.        (xxx)      Inpatient Status Appropriate - This patient's medical care is consistent with medical management for inpatient care and reasonable inpatient medical practice.      () Observation Status Appropriate - This patient does not meet hospital inpatient criteria and is placed in observation status. If this patient's primary payer is Medicare and was admitted as an inpatient, Condition Code 44 should be used and patient status changed to \"observation\".   () Admission Status NOT Appropriate - This patient's medical care is not consistent with medical management for Inpatient or Observation Status.          RATIONALE FOR DETERMINATION     Emanuel Wilkinson is a 31 year old male admitted on 9/6/2021with lymphedema and left lower extremity cellulitis.  Doppler ultrasound was negative for deep venous thrombosis.  Initially he was afebrile and WBC normal.  However, yesterday he spiked a fever to 101.9 and WBC increased to 11.3 today.  I spoke with Dr. Wilson who states the patient will require a longer hospitalization for further IV antibiotics and monitoring.  At this time IP status is appropriate.    The severity of illness, intensity of service provided, expected LOS and risk for adverse outcome make the care complex, high risk and appropriate for hospital admission.        The information on this document is developed by the utilization review team in order for the business office to ensure compliance.  This only denotes the appropriateness of proper admission status and does not reflect the quality of care rendered.         The definitions of Inpatient Status " and Observation Status used in making the determination above are those provided in the CMS Coverage Manual, Chapter 1 and Chapter 6, section 70.4.      Sincerely,     Rebecca Dover MD  Physician Advisor   Utilization Review/ Case Management  [facility].

## 2021-09-08 NOTE — PLAN OF CARE
VSS. Tylenol given for low grade fever 99.8. Pt. Denies pain. Left leg elevated on pillows. Continue to monitor.

## 2021-09-09 ENCOUNTER — APPOINTMENT (OUTPATIENT)
Dept: ULTRASOUND IMAGING | Facility: CLINIC | Age: 31
DRG: 872 | End: 2021-09-09
Attending: HOSPITALIST
Payer: COMMERCIAL

## 2021-09-09 LAB
BASOPHILS # BLD AUTO: 0.1 10E3/UL (ref 0–0.2)
BASOPHILS NFR BLD AUTO: 0 %
EOSINOPHIL # BLD AUTO: 0 10E3/UL (ref 0–0.7)
EOSINOPHIL NFR BLD AUTO: 0 %
ERYTHROCYTE [DISTWIDTH] IN BLOOD BY AUTOMATED COUNT: 13.4 % (ref 10–15)
HCT VFR BLD AUTO: 33.9 % (ref 40–53)
HGB BLD-MCNC: 11.2 G/DL (ref 13.3–17.7)
IMM GRANULOCYTES # BLD: 0.1 10E3/UL
IMM GRANULOCYTES NFR BLD: 1 %
LACTATE SERPL-SCNC: 0.8 MMOL/L (ref 0.7–2)
LYMPHOCYTES # BLD AUTO: 1.4 10E3/UL (ref 0.8–5.3)
LYMPHOCYTES NFR BLD AUTO: 10 %
MCH RBC QN AUTO: 28.9 PG (ref 26.5–33)
MCHC RBC AUTO-ENTMCNC: 33 G/DL (ref 31.5–36.5)
MCV RBC AUTO: 87 FL (ref 78–100)
MONOCYTES # BLD AUTO: 0.8 10E3/UL (ref 0–1.3)
MONOCYTES NFR BLD AUTO: 6 %
MRSA DNA SPEC QL NAA+PROBE: NEGATIVE
NEUTROPHILS # BLD AUTO: 11.2 10E3/UL (ref 1.6–8.3)
NEUTROPHILS NFR BLD AUTO: 83 %
NRBC # BLD AUTO: 0 10E3/UL
NRBC BLD AUTO-RTO: 0 /100
PLATELET # BLD AUTO: 193 10E3/UL (ref 150–450)
POTASSIUM BLD-SCNC: 3.4 MMOL/L (ref 3.4–5.3)
PROCALCITONIN SERPL-MCNC: 1.58 NG/ML
RBC # BLD AUTO: 3.88 10E6/UL (ref 4.4–5.9)
SA TARGET DNA: NEGATIVE
WBC # BLD AUTO: 13.5 10E3/UL (ref 4–11)

## 2021-09-09 PROCEDURE — 250N000013 HC RX MED GY IP 250 OP 250 PS 637: Performed by: INTERNAL MEDICINE

## 2021-09-09 PROCEDURE — 120N000001 HC R&B MED SURG/OB

## 2021-09-09 PROCEDURE — 250N000011 HC RX IP 250 OP 636: Performed by: INTERNAL MEDICINE

## 2021-09-09 PROCEDURE — 36415 COLL VENOUS BLD VENIPUNCTURE: CPT | Performed by: HOSPITALIST

## 2021-09-09 PROCEDURE — 250N000011 HC RX IP 250 OP 636: Performed by: HOSPITALIST

## 2021-09-09 PROCEDURE — 87641 MR-STAPH DNA AMP PROBE: CPT | Performed by: HOSPITALIST

## 2021-09-09 PROCEDURE — 99233 SBSQ HOSP IP/OBS HIGH 50: CPT | Performed by: HOSPITALIST

## 2021-09-09 PROCEDURE — 83605 ASSAY OF LACTIC ACID: CPT | Performed by: HOSPITALIST

## 2021-09-09 PROCEDURE — 76882 US LMTD JT/FCL EVL NVASC XTR: CPT | Mod: LT

## 2021-09-09 PROCEDURE — 85025 COMPLETE CBC W/AUTO DIFF WBC: CPT | Performed by: HOSPITALIST

## 2021-09-09 PROCEDURE — 84145 PROCALCITONIN (PCT): CPT | Performed by: HOSPITALIST

## 2021-09-09 PROCEDURE — 84132 ASSAY OF SERUM POTASSIUM: CPT | Performed by: HOSPITALIST

## 2021-09-09 RX ORDER — PIPERACILLIN SODIUM, TAZOBACTAM SODIUM 4; .5 G/20ML; G/20ML
4.5 INJECTION, POWDER, LYOPHILIZED, FOR SOLUTION INTRAVENOUS EVERY 6 HOURS
Status: DISCONTINUED | OUTPATIENT
Start: 2021-09-09 | End: 2021-09-12 | Stop reason: HOSPADM

## 2021-09-09 RX ADMIN — PIPERACILLIN SODIUM AND TAZOBACTAM SODIUM 4.5 G: 4; .5 INJECTION, POWDER, LYOPHILIZED, FOR SOLUTION INTRAVENOUS at 12:13

## 2021-09-09 RX ADMIN — ACETAMINOPHEN 650 MG: 325 TABLET, FILM COATED ORAL at 20:41

## 2021-09-09 RX ADMIN — ACETAMINOPHEN 650 MG: 325 TABLET, FILM COATED ORAL at 15:41

## 2021-09-09 RX ADMIN — PIPERACILLIN SODIUM AND TAZOBACTAM SODIUM 4.5 G: 4; .5 INJECTION, POWDER, LYOPHILIZED, FOR SOLUTION INTRAVENOUS at 19:20

## 2021-09-09 RX ADMIN — CEFAZOLIN SODIUM 2 G: 2 INJECTION, SOLUTION INTRAVENOUS at 05:25

## 2021-09-09 RX ADMIN — ENOXAPARIN SODIUM 40 MG: 40 INJECTION SUBCUTANEOUS at 15:41

## 2021-09-09 ASSESSMENT — ACTIVITIES OF DAILY LIVING (ADL)
ADLS_ACUITY_SCORE: 17

## 2021-09-09 ASSESSMENT — MIFFLIN-ST. JEOR: SCORE: 2673.59

## 2021-09-09 NOTE — PROGRESS NOTES
[facility]    Medicine Progress Note - Hospitalist Service       Date of Admission:  9/6/2021    Assessment & Plan         Emanuel Wilkinson is a 31 year old male admitted with left lower extremity cellulitis.  He has significant leg edema- ultrasound negative for deep venous thrombosis.  He has been on intravenous cefazolin.      Sepsis secondary to left lower extremity cellulitis   Chronic left lower extremity edema   The the leg looks better but he had 103F temperature last night.  WBC 13.5K, C-reactive protein 300 yesterday- pro-calcitonin down from 3 to 1.5.     Lower extremity  ultrasound to rule out abscess     Change antibiotic to Zosyn     Keep left lower extremity elevated     Monitor fever curve    Repeat WBC, C-reactive protein  and pro-calcitonin tomorrow     Hypokalemia   Potassium   Date Value Ref Range Status   09/09/2021 3.4 3.4 - 5.3 mmol/L Final   Corrected     Acute hrombocytopenia - resolved  Platelet Count   Date Value Ref Range Status   09/09/2021 193 150 - 450 10e3/uL Final       Monitor while on enoxaparin    Elevated blood pressure     Needs primary care provider follow up and home monitoring    Morbid obesity    He needs lifestyle modification and follow up with a primary care provider      Diet: Combination Diet Regular Diet Adult    DVT Prophylaxis: Enoxaparin (Lovenox) SQ  Perdomo Catheter: Not present  Central Lines: None  Code Status: Full Code      Disposition Plan   Expected discharge: 09/09/2021   recommended to prior living arrangement once antibiotic plan established.     The patient's care was discussed with the Patient and Patient's Family.    Tawanda Wilson MD  Hospitalist Service  [facility]  Securely message with the Vocera Web Console (learn more here)  Text page via [employer] Paging/Directory    Clinically Significant Risk Factors Present on Admission                 ______________________________________________________________________    Interval History   103F temperature last evening.  No chills/rigors.  He feels that his leg looks and feels better.     Data reviewed today: I reviewed all medications, new labs and imaging results over the last 24 hours. I personally reviewed no images or EKG's today.    Physical Exam   Vital Signs: Temp: 98.7  F (37.1  C) Temp src: Oral BP: (!) 148/80 Pulse: 91   Resp: 18 SpO2: 95 % O2 Device: None (Room air)    Weight: 369 lbs 6.4 oz   Body mass index is 50.1 kg/m .    Constitutional: awake, alert, cooperative, no apparent distress, and appears stated age  Musculoskeletal: the left lower extremity is less erythematous than yesterday- no new areas of concern but there is considerable edema still   Neuropsychiatric: General: normal, calm and normal eye contact    Data   Recent Labs   Lab 09/09/21  0542 09/08/21  0533 09/07/21  2307 09/06/21  2220   WBC 13.5* 11.3*  --  10.5   HGB 11.2* 11.8*  --  13.2*   MCV 87 86  --  87    126*  --  129*   NA  --  135  --  134   POTASSIUM 3.4 3.7 3.7 3.0*   CHLORIDE  --  103  --  101   CO2  --  26  --  29   BUN  --  13  --  14   CR  --  0.84  --  0.96   ANIONGAP  --  6  --  4   JANINE  --  8.6  --  8.6   GLC  --  100*  --  110*     Recent Results (from the past 24 hour(s))   US Extremity Non Vascular Left    Narrative    LEFT LOWER EXTREMITY NONVASCULAR ULTRASOUND September 9, 2021 at 0929  hours    CLINICAL HISTORY: 31-year-old with soft tissue swelling, concern for  abscess.    TECHNIQUE: Grayscale and color Doppler sonographic imaging was  obtained of the left lower leg at the site of the patient's swelling.    COMPARISON: Radiographs 9/6/2021.    FINDINGS: Moderate ill-defined subcutaneous edema and fluid throughout  the superficial subcutaneous tissues of the anterior left lower leg.  No organized or drainable focal fluid collection.      Impression    IMPRESSION:  1.  Negative for focal or  drainable fluid collection.  2.  Moderate generalized subcutaneous edema in the anterior left shin  at the site of the patient's swelling.    MAGAN URENA MD         SYSTEM ID:  [id number]     Medications       enoxaparin ANTICOAGULANT  40 mg Subcutaneous Q24H     piperacillin-tazobactam  4.5 g Intravenous Q6H     sodium chloride (PF)  3 mL Intracatheter Q8H

## 2021-09-09 NOTE — PROGRESS NOTES
Pt VSS on RA. A&Ox4. Up ind, using bathroom. Left leg swelling improving, sarah new marking. Denies pain. Did have fever, gave tylenol with improvement. Plan to transfer to station 66, possible discharge tomorrow. IV abxs switched to zosyn. Continue to monitor.

## 2021-09-09 NOTE — PLAN OF CARE
Pt is A&Ox4, denies pain at rest, VSS except spike of temp 103.1 F- IS given, LS clear, on RA, L leg elevated on pillows, edges marked, independent in room, Ancef 2g/100 ml given, continue to monitor.

## 2021-09-10 LAB
CRP SERPL-MCNC: 275 MG/L (ref 0–8)
LACTATE SERPL-SCNC: 0.6 MMOL/L (ref 0.7–2)
WBC # BLD AUTO: 14.7 10E3/UL (ref 4–11)

## 2021-09-10 PROCEDURE — 250N000011 HC RX IP 250 OP 636: Performed by: INTERNAL MEDICINE

## 2021-09-10 PROCEDURE — 120N000001 HC R&B MED SURG/OB

## 2021-09-10 PROCEDURE — 250N000011 HC RX IP 250 OP 636: Performed by: HOSPITALIST

## 2021-09-10 PROCEDURE — 83605 ASSAY OF LACTIC ACID: CPT | Performed by: HOSPITALIST

## 2021-09-10 PROCEDURE — 85048 AUTOMATED LEUKOCYTE COUNT: CPT | Performed by: HOSPITALIST

## 2021-09-10 PROCEDURE — 250N000013 HC RX MED GY IP 250 OP 250 PS 637: Performed by: INTERNAL MEDICINE

## 2021-09-10 PROCEDURE — 36415 COLL VENOUS BLD VENIPUNCTURE: CPT | Performed by: HOSPITALIST

## 2021-09-10 PROCEDURE — 86140 C-REACTIVE PROTEIN: CPT | Performed by: HOSPITALIST

## 2021-09-10 PROCEDURE — 99232 SBSQ HOSP IP/OBS MODERATE 35: CPT | Performed by: HOSPITALIST

## 2021-09-10 RX ADMIN — PIPERACILLIN SODIUM AND TAZOBACTAM SODIUM 4.5 G: 4; .5 INJECTION, POWDER, LYOPHILIZED, FOR SOLUTION INTRAVENOUS at 23:45

## 2021-09-10 RX ADMIN — ACETAMINOPHEN 650 MG: 325 TABLET, FILM COATED ORAL at 06:13

## 2021-09-10 RX ADMIN — PIPERACILLIN SODIUM AND TAZOBACTAM SODIUM 4.5 G: 4; .5 INJECTION, POWDER, LYOPHILIZED, FOR SOLUTION INTRAVENOUS at 18:07

## 2021-09-10 RX ADMIN — PIPERACILLIN SODIUM AND TAZOBACTAM SODIUM 4.5 G: 4; .5 INJECTION, POWDER, LYOPHILIZED, FOR SOLUTION INTRAVENOUS at 00:37

## 2021-09-10 RX ADMIN — PIPERACILLIN SODIUM AND TAZOBACTAM SODIUM 4.5 G: 4; .5 INJECTION, POWDER, LYOPHILIZED, FOR SOLUTION INTRAVENOUS at 11:47

## 2021-09-10 RX ADMIN — PIPERACILLIN SODIUM AND TAZOBACTAM SODIUM 4.5 G: 4; .5 INJECTION, POWDER, LYOPHILIZED, FOR SOLUTION INTRAVENOUS at 06:00

## 2021-09-10 RX ADMIN — ENOXAPARIN SODIUM 40 MG: 40 INJECTION SUBCUTANEOUS at 15:32

## 2021-09-10 ASSESSMENT — ACTIVITIES OF DAILY LIVING (ADL)
ADLS_ACUITY_SCORE: 17

## 2021-09-10 NOTE — PLAN OF CARE
DATE & TIME: 9/9/21 2015 - 0730    Cognitive Concerns/ Orientation : A/O x4   BEHAVIOR & AGGRESSION TOOL COLOR: Green  CIWA SCORE: n/a   ABNL VS/O2: Febrile - temps 100.9 & 100.4 (PRN APAP given twice), BP slightly elevated, tachy at times low-100s, RA  MOBILITY: Ind  PAIN MANAGMENT: Denies  DIET: Regular  BOWEL/BLADDER: Continent, up to bathroom  ABNL LAB/BG: WBC 13.5  DRAIN/DEVICES: PIV SL, IV Zosyn  TELEMETRY RHYTHM: n/a  SKIN: LLE red, warm, +4 edema  TESTS/PROCEDURES: US neg for DVT  D/C DATE: Pending abx plan; possibly home today  Discharge Barriers: IV abx

## 2021-09-10 NOTE — PROGRESS NOTES
[facility]    Medicine Progress Note - Hospitalist Service       Date of Admission:  9/6/2021    Assessment & Plan       Emanuel Wilkinson is a 31 year old male admitted with left lower extremity cellulitis.  He has significant leg edema- ultrasound negative for deep venous thrombosis.     Sepsis secondary to left lower extremity cellulitis   Chronic left lower extremity edema   Ultrasound negative for abscess.  Antibiotic switched to Zosyn 9/9.  Tmax down from 103F to 101F yesterday.  C-reactive protein is down a little.      Continue Zosyn     Keep left lower extremity elevated     Monitor fever curve    Repeat WBC, C-reactive protein  and pro-calcitonin tomorrow     Hypokalemia   Potassium   Date Value Ref Range Status   09/09/2021 3.4 3.4 - 5.3 mmol/L Final   Corrected     Acute hrombocytopenia - resolved  Platelet Count   Date Value Ref Range Status   09/09/2021 193 150 - 450 10e3/uL Final       Monitor while on enoxaparin    Elevated blood pressure     Needs primary care provider follow up and home monitoring    Morbid obesity    He needs lifestyle modification and follow up with a primary care provider      Diet: Combination Diet Regular Diet Adult    DVT Prophylaxis: Enoxaparin (Lovenox) SQ  Perdomo Catheter: Not present  Central Lines: None  Code Status: Full Code      Disposition Plan   Expected discharge: 09/11/2021   recommended to prior living arrangement once antibiotic plan established.     The patient's care was discussed with the Patient and Patient's Family.    Tawanda Wilson MD  Hospitalist Service  [facility]  Securely message with the Vocera Web Console (learn more here)  Text page via [employer] Paging/Directory    Clinically Significant Risk Factors Present on Admission                ______________________________________________________________________    Interval History   No new complaints.  Leg is still swollen.  Tmax was 101F last night.       Data reviewed today: I reviewed all medications, new labs and imaging results over the last 24 hours. I personally reviewed no images or EKG's today.    Physical Exam   Vital Signs: Temp: 99.2  F (37.3  C) Temp src: Oral BP: (!) 145/88 Pulse: 101   Resp: 18 SpO2: 96 % O2 Device: None (Room air)    Weight: 369 lbs 6.4 oz   Body mass index is 50.1 kg/m .    Constitutional: awake, alert, cooperative, no apparent distress, and appears stated age  Musculoskeletal: the left lower extremity looks about the same although the blistering is improved.  Neuropsychiatric: General: normal, calm and normal eye contact    Data   Recent Labs   Lab 09/10/21  0749 09/09/21  0542 09/08/21  0533 09/07/21  2307 09/06/21  2220   WBC 14.7* 13.5* 11.3*  --  10.5   HGB  --  11.2* 11.8*  --  13.2*   MCV  --  87 86  --  87   PLT  --  193 126*  --  129*   NA  --   --  135  --  134   POTASSIUM  --  3.4 3.7 3.7 3.0*   CHLORIDE  --   --  103  --  101   CO2  --   --  26  --  29   BUN  --   --  13  --  14   CR  --   --  0.84  --  0.96   ANIONGAP  --   --  6  --  4   JANINE  --   --  8.6  --  8.6   GLC  --   --  100*  --  110*     No results found for this or any previous visit (from the past 24 hour(s)).  Medications       enoxaparin ANTICOAGULANT  40 mg Subcutaneous Q24H     piperacillin-tazobactam  4.5 g Intravenous Q6H     sodium chloride (PF)  3 mL Intracatheter Q8H

## 2021-09-10 NOTE — PROGRESS NOTES
Pt reported having had 3 loose med stool today (12 hour period). Denied nausea, abd cramp. Will monitor.

## 2021-09-11 LAB
CRP SERPL-MCNC: 190 MG/L (ref 0–8)
POTASSIUM BLD-SCNC: 3.6 MMOL/L (ref 3.4–5.3)
WBC # BLD AUTO: 12.1 10E3/UL (ref 4–11)

## 2021-09-11 PROCEDURE — 84132 ASSAY OF SERUM POTASSIUM: CPT | Performed by: HOSPITALIST

## 2021-09-11 PROCEDURE — 85048 AUTOMATED LEUKOCYTE COUNT: CPT | Performed by: HOSPITALIST

## 2021-09-11 PROCEDURE — 99232 SBSQ HOSP IP/OBS MODERATE 35: CPT | Performed by: HOSPITALIST

## 2021-09-11 PROCEDURE — 250N000013 HC RX MED GY IP 250 OP 250 PS 637: Performed by: INTERNAL MEDICINE

## 2021-09-11 PROCEDURE — 36415 COLL VENOUS BLD VENIPUNCTURE: CPT | Performed by: HOSPITALIST

## 2021-09-11 PROCEDURE — 250N000011 HC RX IP 250 OP 636: Performed by: HOSPITALIST

## 2021-09-11 PROCEDURE — 86140 C-REACTIVE PROTEIN: CPT | Performed by: HOSPITALIST

## 2021-09-11 PROCEDURE — 120N000001 HC R&B MED SURG/OB

## 2021-09-11 PROCEDURE — 250N000011 HC RX IP 250 OP 636: Performed by: INTERNAL MEDICINE

## 2021-09-11 RX ADMIN — ACETAMINOPHEN 650 MG: 325 TABLET, FILM COATED ORAL at 16:45

## 2021-09-11 RX ADMIN — ACETAMINOPHEN 650 MG: 325 TABLET, FILM COATED ORAL at 22:44

## 2021-09-11 RX ADMIN — ACETAMINOPHEN 650 MG: 325 TABLET, FILM COATED ORAL at 00:17

## 2021-09-11 RX ADMIN — PIPERACILLIN SODIUM AND TAZOBACTAM SODIUM 4.5 G: 4; .5 INJECTION, POWDER, LYOPHILIZED, FOR SOLUTION INTRAVENOUS at 22:44

## 2021-09-11 RX ADMIN — PIPERACILLIN SODIUM AND TAZOBACTAM SODIUM 4.5 G: 4; .5 INJECTION, POWDER, LYOPHILIZED, FOR SOLUTION INTRAVENOUS at 05:42

## 2021-09-11 RX ADMIN — ENOXAPARIN SODIUM 40 MG: 40 INJECTION SUBCUTANEOUS at 16:46

## 2021-09-11 RX ADMIN — PIPERACILLIN SODIUM AND TAZOBACTAM SODIUM 4.5 G: 4; .5 INJECTION, POWDER, LYOPHILIZED, FOR SOLUTION INTRAVENOUS at 11:03

## 2021-09-11 RX ADMIN — PIPERACILLIN SODIUM AND TAZOBACTAM SODIUM 4.5 G: 4; .5 INJECTION, POWDER, LYOPHILIZED, FOR SOLUTION INTRAVENOUS at 16:46

## 2021-09-11 ASSESSMENT — ACTIVITIES OF DAILY LIVING (ADL)
ADLS_ACUITY_SCORE: 17

## 2021-09-11 NOTE — PLAN OF CARE
DATE & TIME: 9/11/21 [id number]     Cognitive Concerns/ Orientation : A/O x4   BEHAVIOR & AGGRESSION TOOL COLOR: Green  CIWA SCORE: n/a        ABNL VS/O2: VSS on RA, Tmax 99.2  MOBILITY: Ind  PAIN MANAGMENT:denies pain in LLE at rest; states that some pain when walking.   DIET: Regular  BOWEL/BLADDER: Continent, up to bathroom. No loose stools today.  ABNL LAB/BG: WBC 12.1, CRP decreased to 190  DRAIN/DEVICES: PIV SL, IV Zosyn q6h  TELEMETRY RHYTHM: n/a  SKIN: LLE red, warm, +3-4 edema. Boarders of erythema marked.   TESTS/PROCEDURES: n/a  D/C DATE: Possibly tomorrow.  Discharge Barriers: IV abx  OTHER IMPORTANT INFO:

## 2021-09-11 NOTE — PROGRESS NOTES
[facility]    Medicine Progress Note - Hospitalist Service       Date of Admission:  9/6/2021    Assessment & Plan       Emanuel Wilkinson is a 31 year old male admitted with left lower extremity cellulitis.  He has significant leg edema- ultrasound negative for deep venous thrombosis.     Sepsis secondary to left lower extremity cellulitis   Chronic left lower extremity edema   Ultrasound negative for abscess.  Antibiotic switched to Zosyn 9/9.  Tmax24 down from 103F to 100.4F.  C-reactive protein is down from 306=>275=>190.  WBC is down from 14.7=>12.1K.  Leg is still swollen but the erythema is fading.      Continue Zosyn one more day    Keep left lower extremity elevated     Monitor fever curve    Repeat WBC, C-reactive protein  and pro-calcitonin tomorrow     I suspect that he could go home on Augmentin tomorrow    Hypokalemia   Potassium   Date Value Ref Range Status   09/11/2021 3.6 3.4 - 5.3 mmol/L Final   Corrected     Acute hrombocytopenia - resolved  Platelet Count   Date Value Ref Range Status   09/09/2021 193 150 - 450 10e3/uL Final       Monitor while on enoxaparin    Elevated blood pressure     Needs primary care provider follow up and home monitoring    Morbid obesity    He needs lifestyle modification and follow up with a primary care provider      Diet: Combination Diet Regular Diet Adult    DVT Prophylaxis: Enoxaparin (Lovenox) SQ  Perdomo Catheter: Not present  Central Lines: None  Code Status: Full Code      Disposition Plan   Expected discharge: 09/11/2021   recommended to prior living arrangement once antibiotic plan established.     The patient's care was discussed with the Patient and nurse.    Tawanda Wilson MD  Hospitalist Service  [facility]  Securely message with the Vocera Web Console (learn more here)  Text page via [employer] Paging/Directory    Clinically Significant Risk Factors Present on Admission                  ______________________________________________________________________    Interval History   No new complaints.  Leg is still swollen.  Tmax was 100.4F last night.      Data reviewed today: I reviewed all medications, new labs and imaging results over the last 24 hours. I personally reviewed no images or EKG's today.    Physical Exam   Vital Signs: Temp: 99.2  F (37.3  C) Temp src: Oral BP: (!) 147/83 Pulse: 92   Resp: 18 SpO2: 97 % O2 Device: None (Room air)    Weight: 369 lbs 6.4 oz   Body mass index is 50.1 kg/m .    Constitutional: awake, alert, cooperative, no apparent distress, and appears stated age  Musculoskeletal: the left lower extremity is still very edematous but the erythema and warmth have markedly improved  Neuropsychiatric: General: normal, calm and normal eye contact    Data   Recent Labs   Lab 09/11/21  0800 09/10/21  0749 09/09/21  0542 09/08/21  0533 09/06/21  2220   WBC 12.1* 14.7* 13.5* 11.3* 10.5   HGB  --   --  11.2* 11.8* 13.2*   MCV  --   --  87 86 87   PLT  --   --  193 126* 129*   NA  --   --   --  135 134   POTASSIUM 3.6  --  3.4 3.7 3.0*   CHLORIDE  --   --   --  103 101   CO2  --   --   --  26 29   BUN  --   --   --  13 14   CR  --   --   --  0.84 0.96   ANIONGAP  --   --   --  6 4   JANINE  --   --   --  8.6 8.6   GLC  --   --   --  100* 110*     No results found for this or any previous visit (from the past 24 hour(s)).  Medications       enoxaparin ANTICOAGULANT  40 mg Subcutaneous Q24H     piperacillin-tazobactam  4.5 g Intravenous Q6H     sodium chloride (PF)  3 mL Intracatheter Q8H

## 2021-09-11 NOTE — PLAN OF CARE
DATE & TIME: 9/10/21 [id number]    Cognitive Concerns/ Orientation : A/O x4   BEHAVIOR & AGGRESSION TOOL COLOR: Green  CIWA SCORE: n/a   ABNL VS/O2: temp 101.1 pulse 110 other VSS, RA  MOBILITY: Ind  PAIN MANAGMENT: pain of 3 denied intervention has PRN tylenol and oxy  DIET: Regular  BOWEL/BLADDER: Continent, up to bathroom  ABNL LAB/BG: WBC 14.7, CRP decreased to 275, lactic 0.6  DRAIN/DEVICES: PIV SL, IV Zosyn q6h  TELEMETRY RHYTHM: n/a  SKIN: LLE red, warm, +4 edema  TESTS/PROCEDURES: US neg for DVT  D/C DATE: Pending abx plan; possibly home tomorrow  Discharge Barriers: IV abx  OTHER IMPORTANT INFO:

## 2021-09-11 NOTE — PLAN OF CARE
DATE & TIME: 9/11/21 6 AM            Cognitive Concerns/ Orientation : A/O x4   BEHAVIOR & AGGRESSION TOOL COLOR: Green  CIWA SCORE: n/a        ABNL VS/O2: Low grade fever 99.6; Tylenol x 1 given. Other VSS on RA  MOBILITY: Ind  PAIN MANAGMENT:denies pain in LLE at rest; states that some pain when walking.   DIET: Regular  BOWEL/BLADDER: Continent, up to bathroom. Reports loose BMs yesterday, will continue to monitor. Denies abdominal pain or nausea.   ABNL LAB/BG: WBC 14.7, CRP decreased to 275, lactic 0.6  DRAIN/DEVICES: PIV SL, IV Zosyn q6h  TELEMETRY RHYTHM: n/a  SKIN: LLE red, warm, +4 edema. Boarders of erythema marked.   TESTS/PROCEDURES: n/a  D/C DATE: Pending abx plan and once afebrile.   Discharge Barriers: IV abx  OTHER IMPORTANT INFO:

## 2021-09-12 VITALS
DIASTOLIC BLOOD PRESSURE: 81 MMHG | HEART RATE: 103 BPM | RESPIRATION RATE: 16 BRPM | HEIGHT: 72 IN | BODY MASS INDEX: 42.66 KG/M2 | SYSTOLIC BLOOD PRESSURE: 149 MMHG | WEIGHT: 315 LBS | TEMPERATURE: 99.5 F | OXYGEN SATURATION: 96 %

## 2021-09-12 LAB
CRP SERPL-MCNC: 134 MG/L (ref 0–8)
PLATELET # BLD AUTO: 320 10E3/UL (ref 150–450)
WBC # BLD AUTO: 10.5 10E3/UL (ref 4–11)

## 2021-09-12 PROCEDURE — 85048 AUTOMATED LEUKOCYTE COUNT: CPT | Performed by: INTERNAL MEDICINE

## 2021-09-12 PROCEDURE — 85049 AUTOMATED PLATELET COUNT: CPT | Performed by: INTERNAL MEDICINE

## 2021-09-12 PROCEDURE — 36415 COLL VENOUS BLD VENIPUNCTURE: CPT | Performed by: INTERNAL MEDICINE

## 2021-09-12 PROCEDURE — 99239 HOSP IP/OBS DSCHRG MGMT >30: CPT | Performed by: INTERNAL MEDICINE

## 2021-09-12 PROCEDURE — 86140 C-REACTIVE PROTEIN: CPT | Performed by: INTERNAL MEDICINE

## 2021-09-12 PROCEDURE — 250N000011 HC RX IP 250 OP 636: Performed by: HOSPITALIST

## 2021-09-12 RX ORDER — ACETAMINOPHEN 325 MG/1
650 TABLET ORAL EVERY 6 HOURS PRN
COMMUNITY
Start: 2021-09-12

## 2021-09-12 RX ADMIN — PIPERACILLIN SODIUM AND TAZOBACTAM SODIUM 4.5 G: 4; .5 INJECTION, POWDER, LYOPHILIZED, FOR SOLUTION INTRAVENOUS at 05:30

## 2021-09-12 RX ADMIN — PIPERACILLIN SODIUM AND TAZOBACTAM SODIUM 4.5 G: 4; .5 INJECTION, POWDER, LYOPHILIZED, FOR SOLUTION INTRAVENOUS at 11:11

## 2021-09-12 ASSESSMENT — ACTIVITIES OF DAILY LIVING (ADL)
ADLS_ACUITY_SCORE: 17

## 2021-09-12 NOTE — PLAN OF CARE
"Cognitive Concerns/ Orientation : A&O x4. Calm and cooperative.   BEHAVIOR & AGGRESSION TOOL COLOR: Green  CIWA SCORE: NA       ABNL VS/O2: VSS on RA, Tmax 100.3, now 99.8.   MOBILITY: Independent in room. Using bathroom.   PAIN MANAGMENT: Some increased discomfort in LLE with ambulation. LLE elevated when at rest.   DIET: Regular, good appetite. Family brought in dinner this evening.   BOWEL/BLADDER: Continent, up to bathroom. Pt reports occasional \"looser\" stool.   ABNL LAB/BG: WBC improved to 12.1, CRP decreased to 190.  DRAIN/DEVICES: PIV SL, IV Zosyn q6h  TELEMETRY RHYTHM: NA  SKIN: LLE red, warm, +3-4 edema. Boarders of erythema marked and receeding from line.   TESTS/PROCEDURES: AM labs   D/C DATE: Possibly tomorrow when abx plan in place.   Discharge Barriers: IV abx, mild temps   OTHER IMPORTANT INFO: Calls as needed. Moving freq in room. Appreciative of cares. Elevating LLE at rest in bed.   "

## 2021-09-12 NOTE — DISCHARGE SUMMARY
[facility]    Discharge Summary  Hospitalist    Date of Admission:  9/6/2021  Date of Discharge:  9/12/2021  Discharging Provider: Nan Butt MD    Discharge Diagnoses   Lower extremity cellulitis-left    History of Present Illness   Please review admission history and physical.    Hospital Course   Emanuel Wilkinson was admitted on 9/6/2021.  The following problems were addressed during his hospitalization:    Active Problems:    Cellulitis of left lower extremity  Emanuel Wilkinson is a 31 year old male admitted with left lower extremity cellulitis.  He has significant leg edema- ultrasound negative for deep venous thrombosis.      Sepsis secondary to left lower extremity cellulitis   Chronic left lower extremity edema   Ultrasound negative for abscess.  Antibiotic switched to Zosyn 9/9.  Following transition to Zosyn his T-max remained 99.2, his CRP is trending down, white count is normalized, his left lower extremity is still swollen but erythema has improved significantly, patient wanted to be released to go home, discussed about keeping his leg elevated at home, need further work-up regarding his left lower extremity swelling, for completion of work-up he might need had a CT of the abdomen to evaluate any cause for venous obstruction intra-abdominally.  This have to be accomplished outpatient.  Patient to continue wearing JONNA hose once cellulitis improves.  He is advised to return to the emergency department if he develops fever of 101 or more at home while on Augmentin.  Plan to continue Augmentin to complete the course of 14 days.   Hypokalemia-resolved   Acute thrombocytopenia - resolved           Elevated blood pressure     Needs primary care provider follow up and home monitoring     Morbid obesity    He needs lifestyle modification and follow up with a primary care provider     Nan Butt MD    Significant Results and Procedures       Pending Results     Unresulted Labs  Ordered in the Past 30 Days of this Admission     No orders found from 8/7/2021 to 9/7/2021.          Code Status   Full Code       Primary Care Physician   Emanuel Wong    Physical Exam   Temp: 99.5  F (37.5  C) Temp src: Oral BP: (!) 149/81 Pulse: 103   Resp: 16 SpO2: 96 % O2 Device: None (Room air)    Vitals:    09/07/21 0102 09/08/21 0629 09/09/21 0556   Weight: (!) 168.3 kg (371 lb) (!) 167 kg (368 lb 3.2 oz) (!) 167.6 kg (369 lb 6.4 oz)     Vital Signs with Ranges  Temp:  [99.1  F (37.3  C)-100.3  F (37.9  C)] 99.5  F (37.5  C)  Pulse:  [] 103  Resp:  [16-18] 16  BP: (146-151)/(78-83) 149/81  SpO2:  [95 %-98 %] 96 %  No intake/output data recorded.    The patient was examined on the day of discharge.    Discharge Disposition   Discharged to home  Condition at discharge: Stable    Consultations This Hospital Stay   None    Time Spent on this Encounter   I, Nan Butt MD, personally saw the patient today and spent greater than 30 minutes discharging this patient.    Discharge Orders      Follow-up and recommended labs and tests     If you choose to establish care with a new PCP  Establishing a primary care physician is an important step in maintaining your overall health and identifying risk factors early.  After your post-hospital followup visit, you should continue to see this doctor once a year for a physical and as recommended by that doctor.  You may choose any provider for your post-hospital appointment, but for your convenience, a care coordinator has identified a [facility] clinic near you.     Call to schedule your appointment today.  [facility] 24/7 Appointment line: [phone] ([phone])           Reason for your hospital stay    Cellulitis of left lower extremity     Follow-up and recommended labs and tests     Follow up with primary care provider, Emanuel Wong, within 7 days for hospital follow- up.  The following labs/tests are recommended: cbc and c reactive  protein  in 4-5 days .     Activity    Your activity upon discharge: activity as tolerated,avoid prolonged standing, keep leg elevated while resting.ambulate every 1-2 hours and drink plenty of fluids .     Discharge Instructions    While on antibiotics  if you develop a fever of 101 please report to emergency room     Diet    Follow this diet upon discharge: Orders Placed This Encounter      Combination Diet Regular Diet Adult     Discharge Medications   Current Discharge Medication List      START taking these medications    Details   acetaminophen (TYLENOL) 325 MG tablet Take 2 tablets (650 mg) by mouth every 6 hours as needed for mild pain or other (and adjunct with moderate or severe pain or per patient request)      amoxicillin-clavulanate (AUGMENTIN) 875-125 MG tablet Take 1 tablet by mouth 2 times daily for 7 days  Qty: 14 tablet, Refills: 0    Associated Diagnoses: Cellulitis of left lower extremity         CONTINUE these medications which have NOT CHANGED    Details   fexofenadine (ALLEGRA) 180 MG tablet Take 180 mg by mouth daily as needed for allergies      fluticasone (FLONASE) 50 MCG/ACT nasal spray Spray 1 spray into both nostrils daily as needed for rhinitis or allergies           Allergies   No Known Allergies  Data   Most Recent 3 CBC's:Recent Labs   Lab Test 09/12/21  0742 09/11/21  0800 09/10/21  0749 09/09/21  0542 09/08/21  0533 09/06/21  2220   WBC 10.5 12.1* 14.7* 13.5* 11.3* 10.5   HGB  --   --   --  11.2* 11.8* 13.2*   MCV  --   --   --  87 86 87     --   --  193 126* 129*      Most Recent 3 BMP's:  Recent Labs   Lab Test 09/11/21  0800 09/09/21  0542 09/08/21  0533 09/06/21  2220   NA  --   --  135 134   POTASSIUM 3.6 3.4 3.7 3.0*   CHLORIDE  --   --  103 101   CO2  --   --  26 29   BUN  --   --  13 14   CR  --   --  0.84 0.96   ANIONGAP  --   --  6 4   JANINE  --   --  8.6 8.6   GLC  --   --  100* 110*     Most Recent 2 LFT's:No lab results found.  Most Recent INR's and  Anticoagulation Dosing History:  Anticoagulation Dose History    There is no flowsheet data to display.       Most Recent 3 Troponin's:No lab results found.  Most Recent Cholesterol Panel:No lab results found.  Most Recent 6 Bacteria Isolates From Any Culture (See EPIC Reports for Culture Details):No lab results found.  Most Recent TSH, T4 and A1c Labs:No lab results found.  Results for orders placed or performed during the hospital encounter of 09/06/21   US Lower Extremity Venous Duplex Left    Narrative    EXAM: US LOWER EXTREMITY VENOUS DUPLEX LEFT  LOCATION: [facility]  DATE/TIME: 9/6/2021 9:42 PM    INDICATION: Leg pain swelling and erythema  COMPARISON: None.  TECHNIQUE: Venous Duplex ultrasound of the left lower extremity with and without compression, augmentation and duplex. Color flow and spectral Doppler with waveform analysis performed.    FINDINGS: Exam includes the common femoral, femoral, popliteal, and contralateral common femoral veins as well as segmentally visualized deep calf veins and greater saphenous vein.     LEFT: No deep vein thrombosis. No superficial thrombophlebitis. No popliteal cyst. Nonspecific subcutaneous edema.      Impression    IMPRESSION:  1.  No deep venous thrombosis in the left lower extremity although exam is compromised from patient's body habitus. Subcutaneous edema distally.   XR Tibia & Fibula Left 2 Views    Narrative    EXAM: XR TIBIA and FIBULA LT 2 VW  LOCATION: [facility]  DATE/TIME: 9/6/2021 11:01 PM    INDICATION: eval for gas, swelling redness  COMPARISON: None.      Impression    IMPRESSION: Marked soft tissue swelling. No evidence for soft tissue. Tibia and fibula appear normal.   US Extremity Non Vascular Left    Narrative    LEFT LOWER EXTREMITY NONVASCULAR ULTRASOUND September 9, 2021 at 0929  hours    CLINICAL HISTORY: 31-year-old with soft tissue swelling, concern for  abscess.    TECHNIQUE: Grayscale and  color Doppler sonographic imaging was  obtained of the left lower leg at the site of the patient's swelling.    COMPARISON: Radiographs 9/6/2021.    FINDINGS: Moderate ill-defined subcutaneous edema and fluid throughout  the superficial subcutaneous tissues of the anterior left lower leg.  No organized or drainable focal fluid collection.      Impression    IMPRESSION:  1.  Negative for focal or drainable fluid collection.  2.  Moderate generalized subcutaneous edema in the anterior left shin  at the site of the patient's swelling.    MAGAN URENA MD         SYSTEM ID:  [id number]

## 2021-09-12 NOTE — PROGRESS NOTES
Discharge    Patient discharged to home via own transportation with father.   Care plan note: Pt is A&Ox4, up Ind, denies pain and is ready for discharge. Pt discharged with oral antibiotic regimen and PRN tylenol. Provided discharge summary and education with patient and father present. Patient will make his own appointment with his PCP.    Listed belongings gathered and returned to patient. No  Belongings returned to patient from security/pharmacy No  Care Plan and Patient education resolved: Yes  Prescriptions if needed, hard copies sent with patient  NA  Home and hospital acquired medications returned to patient: NA  Medication Bin checked and emptied on discharge Yes  Follow up appointment made for patient: No

## 2021-09-12 NOTE — PLAN OF CARE
DATE & TIME: 9/12/21 6 AM            Cognitive Concerns/ Orientation : A/O x4   BEHAVIOR & AGGRESSION TOOL COLOR: Green  CIWA SCORE: n/a        ABNL VS/O2:VSS on RA. Temp 99.2  MOBILITY: Ind  PAIN MANAGMENT:denies pain in LLE at rest; states that some pain when walking.   DIET: Regular  BOWEL/BLADDER: Continent, up to bathroom. Reports loose BMs yesterday, will continue to monitor. Denies abdominal pain or nausea.   ABNL LAB/BG: WBC 12.1(trending down),  (trending down)   DRAIN/DEVICES: PIV SL, IV Zosyn q6h  TELEMETRY RHYTHM: n/a  SKIN: LLE red, warm, +4 edema. Boarders of erythema marked.   TESTS/PROCEDURES: n/a  D/C DATE: Pending abx plan and once afebrile.   Discharge Barriers: IV abx  OTHER IMPORTANT INFO:

## 2021-10-24 ENCOUNTER — HEALTH MAINTENANCE LETTER (OUTPATIENT)
Age: 31
End: 2021-10-24

## 2021-12-19 ENCOUNTER — HEALTH MAINTENANCE LETTER (OUTPATIENT)
Age: 31
End: 2021-12-19

## 2022-10-15 ENCOUNTER — HEALTH MAINTENANCE LETTER (OUTPATIENT)
Age: 32
End: 2022-10-15

## 2022-12-03 ENCOUNTER — HEALTH MAINTENANCE LETTER (OUTPATIENT)
Age: 32
End: 2022-12-03

## 2024-01-07 ENCOUNTER — HEALTH MAINTENANCE LETTER (OUTPATIENT)
Age: 34
End: 2024-01-07